# Patient Record
Sex: FEMALE | Race: WHITE | Employment: FULL TIME | URBAN - METROPOLITAN AREA
[De-identification: names, ages, dates, MRNs, and addresses within clinical notes are randomized per-mention and may not be internally consistent; named-entity substitution may affect disease eponyms.]

---

## 2021-07-19 ENCOUNTER — OFFICE VISIT (OUTPATIENT)
Dept: FAMILY MEDICINE CLINIC | Age: 46
End: 2021-07-19
Payer: COMMERCIAL

## 2021-07-19 VITALS
TEMPERATURE: 98.5 F | HEART RATE: 63 BPM | OXYGEN SATURATION: 97 % | BODY MASS INDEX: 22.02 KG/M2 | WEIGHT: 132.2 LBS | HEIGHT: 65 IN | RESPIRATION RATE: 16 BRPM | DIASTOLIC BLOOD PRESSURE: 70 MMHG | SYSTOLIC BLOOD PRESSURE: 126 MMHG

## 2021-07-19 DIAGNOSIS — Z11.59 ENCOUNTER FOR HEPATITIS C SCREENING TEST FOR LOW RISK PATIENT: ICD-10-CM

## 2021-07-19 DIAGNOSIS — Z76.89 ENCOUNTER TO ESTABLISH CARE: Primary | ICD-10-CM

## 2021-07-19 DIAGNOSIS — Z13.220 SCREENING, LIPID: ICD-10-CM

## 2021-07-19 DIAGNOSIS — R00.2 PALPITATIONS: ICD-10-CM

## 2021-07-19 DIAGNOSIS — Z88.6 SAMTER'S TRIAD: ICD-10-CM

## 2021-07-19 DIAGNOSIS — Z13.89 SCREENING FOR BLOOD OR PROTEIN IN URINE: ICD-10-CM

## 2021-07-19 DIAGNOSIS — H61.21 EXCESSIVE CERUMEN IN EAR CANAL, RIGHT: ICD-10-CM

## 2021-07-19 DIAGNOSIS — Z13.1 SCREENING FOR DIABETES MELLITUS: ICD-10-CM

## 2021-07-19 DIAGNOSIS — J45.909 SAMTER'S TRIAD: ICD-10-CM

## 2021-07-19 DIAGNOSIS — J33.9 SAMTER'S TRIAD: ICD-10-CM

## 2021-07-19 DIAGNOSIS — Z13.29 SCREENING FOR THYROID DISORDER: ICD-10-CM

## 2021-07-19 DIAGNOSIS — F32.A ANXIETY AND DEPRESSION: ICD-10-CM

## 2021-07-19 DIAGNOSIS — Z13.0 SCREENING FOR DEFICIENCY ANEMIA: ICD-10-CM

## 2021-07-19 DIAGNOSIS — Z13.21 ENCOUNTER FOR VITAMIN DEFICIENCY SCREENING: ICD-10-CM

## 2021-07-19 DIAGNOSIS — J30.89 ENVIRONMENTAL AND SEASONAL ALLERGIES: ICD-10-CM

## 2021-07-19 DIAGNOSIS — F41.9 ANXIETY AND DEPRESSION: ICD-10-CM

## 2021-07-19 PROCEDURE — 99204 OFFICE O/P NEW MOD 45 MIN: CPT | Performed by: FAMILY MEDICINE

## 2021-07-19 RX ORDER — SERTRALINE HYDROCHLORIDE 100 MG/1
100 TABLET, FILM COATED ORAL DAILY
COMMUNITY

## 2021-07-19 RX ORDER — DROSPIRENONE AND ETHINYL ESTRADIOL 0.02-3(28)
1 KIT ORAL DAILY
COMMUNITY

## 2021-07-19 NOTE — PROGRESS NOTES
Chief Complaint   Patient presents with    Establish Care    Irregular Heart Beat     feels like a flutter over the last couple of months it last about 5 sec. 1. Have you been to the ER, urgent care clinic since your last visit? Hospitalized since your last visit? Yes Where: [facility] back in May    2. Have you seen or consulted any other health care providers outside of the [address] [place] [address] since your last visit? Include any pap smears or colon screening.  Yes Where: psych NP Raf Macdonald seen in May

## 2021-07-19 NOTE — PROGRESS NOTES
Niyah Marion  42 y.o. female  1975  [id number] [facility]  [id number]     [facility]       Encounter Date: 7/19/2021           New Patient Visit Note: Liberty Min MD    Previous PCP: urgent care    Reason for Appointment:  Chief Complaint   Patient presents with   [place] Establish Care    Irregular Heart Beat     feels like a flutter over the last couple of months it last about 5 sec. History of Present Illness:  History provided by patient    Jose Martin Kessler is a 42 y.o. female who presents to clinic today for appointment to establish care. Conditions addressed today include:     Palpitations  Reports over the last two months  Denies any other symtpoms  Timing: last 5-10 seconds  Occurs a few times per week (3-4 times per week)  She reports being at rest when it occurs  No particular trigger  She reports that she has never sen a cardiologist in the past  She denies any current palpitations    Wax in Ear: she reports that it comes and goes. The last time she required irrigation was about 10 years ago. Sampter's Triad: diagnosed about 10 years ago  Seasaonal Allergies: takes claritine PRN  Anxiety and Depression: takes Zoloft, followed by [facility], reports doing well with the zoloft     GYN: dr Mathieu Najera. Abnormal Mammogram: she reports that she had 2nd mammogram and US, She states that it was reported as normal. She will be due for repeat in 1 year. Review of Systems  All other ROS were reviewed and are negative except as discussed in HPI        Allergies: Aspirin and Nsaids (non-steroidal anti-inflammatory drug)    Medications: (Updated to reflect final medication list after visit)    Current Outpatient Medications:     drospirenone-ethinyl estradioL (Radha, 28,) 3-0.02 mg tab, Take 1 Tablet by mouth daily. , Disp: , Rfl:     sertraline (Zoloft) 100 mg tablet, Take 100 mg by mouth daily. , Disp: , Rfl:     carbamide peroxide (DEBROX) 6.5 % otic solution, Administer 5 Drops into each ear two (2) times a day. For two weeks, Disp: 15 mL, Rfl: 0    History  Patient Care Team:  Jane Land MD as PCP - General (Family Medicine)  Jane Land MD as PCP - [facility]    Past Medical History: she has no past medical history on file. Past Surgical History: she has a past surgical history that includes hx  section (10/2017); hx dilation and curettage; hx wisdom teeth extraction; and hx refractive surgery. Family Medical History: family history includes Anxiety in her mother; Heart Disease in her mother; No Known Problems in her father. Social History: she reports that she has never smoked. She has never used smokeless tobacco. She reports current alcohol use. She reports that she does not use drugs. ; Hobbies: exercise, baking, exploring [employer] Due   Topic Date Due    PAP AKA CERVICAL CYTOLOGY  Never done       Objective:   Visit Vitals  /70 (BP 1 Location: Left upper arm, BP Patient Position: Sitting, BP Cuff Size: Adult)   Pulse 63   Temp 98.5 °F (36.9 °C) (Oral)   Resp 16   Ht 5' 5\" (1.651 m)   Wt 132 lb 3.2 oz (60 kg)   LMP 2021   SpO2 97%   BMI 22.00 kg/m²     Wt Readings from Last 3 Encounters:   21 132 lb 3.2 oz (60 kg)       Physical Exam  Vitals reviewed. Constitutional:       General: She is not in acute distress. Appearance: Normal appearance. She is normal weight. She is not ill-appearing or toxic-appearing. HENT:      Head: Normocephalic and atraumatic. Right Ear: Hearing normal. No drainage. No middle ear effusion. Tympanic membrane is not injected or erythematous. Left Ear: Hearing and ear canal normal. No drainage. No middle ear effusion. Tympanic membrane is not injected or erythematous. Ears:      Comments: Excess cerumen in right ear, left ear normal     Nose: Nose normal. No nasal deformity or septal deviation.       Mouth/Throat: Lips: Pink. No lesions. Mouth: Mucous membranes are moist.      Palate: No mass. Pharynx: Oropharynx is clear. Uvula midline. No pharyngeal swelling, oropharyngeal exudate or posterior oropharyngeal erythema. Tonsils: No tonsillar exudate. Eyes:      General: Lids are normal. Vision grossly intact. Gaze aligned appropriately. Right eye: No discharge. Left eye: No discharge. Extraocular Movements: Extraocular movements intact. Conjunctiva/sclera: Conjunctivae normal.      Right eye: Right conjunctiva is not injected. Left eye: Left conjunctiva is not injected. Pupils: Pupils are equal, round, and reactive to light. Neck:      Vascular: No carotid bruit. Cardiovascular:      Rate and Rhythm: Normal rate and regular rhythm. Pulses: Normal pulses. Heart sounds: Normal heart sounds. No murmur heard. No friction rub. No gallop. Pulmonary:      Effort: Pulmonary effort is normal. No respiratory distress. Breath sounds: Normal breath sounds. No stridor. No wheezing, rhonchi or rales. Abdominal:      General: Bowel sounds are normal. There is no distension or abdominal bruit. Tenderness: There is no abdominal tenderness. There is no guarding. Musculoskeletal:         General: Normal range of motion. Cervical back: Normal range of motion and neck supple. No rigidity. No muscular tenderness. Lymphadenopathy:      Cervical: No cervical adenopathy. Skin:     General: Skin is warm. Coloration: Skin is not jaundiced. Neurological:      General: No focal deficit present. Mental Status: She is alert. Motor: Motor function is intact. Coordination: Coordination is intact. Gait: Gait is intact. Deep Tendon Reflexes:      Reflex Scores:       Patellar reflexes are 2+ on the right side and 2+ on the left side.   Psychiatric:         Attention and Perception: Attention and perception normal.         Mood and Affect: Mood and affect normal.         Speech: Speech normal.         Cognition and Memory: Cognition and memory normal.           Assessment & Plan:      ICD-10-CM ICD-9-CM    1. Encounter to establish care  Z76.89 V65.8    2. Palpitations  R00.2 785.1 REFERRAL TO CARDIOLOGY      TSH 3RD GENERATION      T4, FREE      T4, FREE      TSH 3RD GENERATION   3. Excessive cerumen in ear canal, right  H61.21 380.4 carbamide peroxide (DEBROX) 6.5 % otic solution   4. Screening for thyroid disorder  Z13.29 V77.0    5. Screening for blood or protein in urine  Z13.89 V82.9    6. Screening, lipid  Z13.220 V77.91 LIPID PANEL      METABOLIC PANEL, COMPREHENSIVE      METABOLIC PANEL, COMPREHENSIVE      LIPID PANEL   7. Screening for diabetes mellitus  Z13.1 V77.1 HEMOGLOBIN A1C WITH EAG      HEMOGLOBIN A1C WITH EAG   8. Screening for deficiency anemia  Z13.0 V78.1 CBC W/O DIFF      CBC W/O DIFF   9. Encounter for hepatitis C screening test for low risk patient  Z11.59 V73.89 HCV AB W/RFLX TO [facility]      HCV AB W/RFLX TO [facility]   10. Encounter for vitamin deficiency screening  Z13.21 V77.99 VITAMIN D, 25 HYDROXY      VITAMIN D, 25 HYDROXY        Encounter to Establish Care: New patient visit; reviewed and addressed patient's medical history and concerns as discussed in note. Discussed recommendations for diet, exercise, and lifestyle.  Palpations: New problem, uncontrolled. Check labs and referral to cardiology.  Excessive Cerumen: Newly identified, uncontrolled. Treat with debrox. All other conditions listed above: chronic and stable. Will continue current treatment regimen. Will check labs as reflected above. Discussed following up with specialist as scheduled. Discussed recommendations for diet and exercise.  Recommended Screenings: discussed recommendations for screenings as written above          I have discussed the diagnosis with the patient and the intended plan as seen in the above orders.   The patient has received an after-visit summary along with patient information handout. I have discussed medication side effects and warnings with the patient as well.     Disposition  Follow-up and Dispositions    · Return in about 1 year (around 7/19/2022), or sooner if symptoms worsen or fail to improve, for annual physical.           Herbert Keating MD

## 2021-07-20 LAB
25(OH)D3 SERPL-MCNC: 27.2 NG/ML (ref 30–100)
ALBUMIN SERPL-MCNC: 3.6 G/DL (ref 3.5–5)
ALBUMIN/GLOB SERPL: 1.1 {RATIO} (ref 1.1–2.2)
ALP SERPL-CCNC: 55 U/L (ref 45–117)
ALT SERPL-CCNC: 14 U/L (ref 12–78)
ANION GAP SERPL CALC-SCNC: 6 MMOL/L (ref 5–15)
AST SERPL-CCNC: 9 U/L (ref 15–37)
BILIRUB SERPL-MCNC: 0.3 MG/DL (ref 0.2–1)
BUN SERPL-MCNC: 9 MG/DL (ref 6–20)
BUN/CREAT SERPL: 12 (ref 12–20)
CALCIUM SERPL-MCNC: 8.8 MG/DL (ref 8.5–10.1)
CHLORIDE SERPL-SCNC: 107 MMOL/L (ref 97–108)
CHOLEST SERPL-MCNC: 263 MG/DL
CO2 SERPL-SCNC: 27 MMOL/L (ref 21–32)
CREAT SERPL-MCNC: 0.77 MG/DL (ref 0.55–1.02)
ERYTHROCYTE [DISTWIDTH] IN BLOOD BY AUTOMATED COUNT: 13 % (ref 11.5–14.5)
EST. AVERAGE GLUCOSE BLD GHB EST-MCNC: 103 MG/DL
GLOBULIN SER CALC-MCNC: 3.3 G/DL (ref 2–4)
GLUCOSE SERPL-MCNC: 88 MG/DL (ref 65–100)
HBA1C MFR BLD: 5.2 % (ref 4–5.6)
HCT VFR BLD AUTO: 44.7 % (ref 35–47)
HDLC SERPL-MCNC: 82 MG/DL
HDLC SERPL: 3.2 {RATIO} (ref 0–5)
HGB BLD-MCNC: 14.2 G/DL (ref 11.5–16)
LDLC SERPL CALC-MCNC: 157.6 MG/DL (ref 0–100)
MCH RBC QN AUTO: 31.1 PG (ref 26–34)
MCHC RBC AUTO-ENTMCNC: 31.8 G/DL (ref 30–36.5)
MCV RBC AUTO: 97.8 FL (ref 80–99)
NRBC # BLD: 0 K/UL (ref 0–0.01)
NRBC BLD-RTO: 0 PER 100 WBC
PLATELET # BLD AUTO: 263 K/UL (ref 150–400)
PMV BLD AUTO: 9.9 FL (ref 8.9–12.9)
POTASSIUM SERPL-SCNC: 4 MMOL/L (ref 3.5–5.1)
PROT SERPL-MCNC: 6.9 G/DL (ref 6.4–8.2)
RBC # BLD AUTO: 4.57 M/UL (ref 3.8–5.2)
SODIUM SERPL-SCNC: 140 MMOL/L (ref 136–145)
T4 FREE SERPL-MCNC: 1.1 NG/DL (ref 0.8–1.5)
TRIGL SERPL-MCNC: 117 MG/DL (ref ?–150)
TSH SERPL DL<=0.05 MIU/L-ACNC: 1.43 UIU/ML (ref 0.36–3.74)
VLDLC SERPL CALC-MCNC: 23.4 MG/DL
WBC # BLD AUTO: 7.8 K/UL (ref 3.6–11)

## 2021-07-21 LAB
HCV AB S/CO SERPL IA: 0.2 S/CO RATIO (ref 0–0.9)
HCV AB SERPL QL IA: NORMAL

## 2021-07-25 PROBLEM — Z88.6 SAMTER'S TRIAD: Status: ACTIVE | Noted: 2021-07-25

## 2021-07-25 PROBLEM — R00.2 PALPITATIONS: Status: ACTIVE | Noted: 2021-07-25

## 2021-07-25 PROBLEM — H61.21 EXCESSIVE CERUMEN IN EAR CANAL, RIGHT: Status: ACTIVE | Noted: 2021-07-25

## 2021-07-25 PROBLEM — F32.A ANXIETY AND DEPRESSION: Status: ACTIVE | Noted: 2021-07-25

## 2021-07-25 PROBLEM — J45.909 SAMTER'S TRIAD: Status: ACTIVE | Noted: 2021-07-25

## 2021-07-25 PROBLEM — F41.9 ANXIETY AND DEPRESSION: Status: ACTIVE | Noted: 2021-07-25

## 2021-07-25 PROBLEM — J30.89 ENVIRONMENTAL AND SEASONAL ALLERGIES: Status: ACTIVE | Noted: 2021-07-25

## 2021-07-25 PROBLEM — J33.9 SAMTER'S TRIAD: Status: ACTIVE | Noted: 2021-07-25

## 2021-10-07 ENCOUNTER — OFFICE VISIT (OUTPATIENT)
Dept: CARDIOLOGY CLINIC | Age: 46
End: 2021-10-07
Payer: COMMERCIAL

## 2021-10-07 VITALS
OXYGEN SATURATION: 100 % | DIASTOLIC BLOOD PRESSURE: 60 MMHG | WEIGHT: 132 LBS | SYSTOLIC BLOOD PRESSURE: 100 MMHG | RESPIRATION RATE: 18 BRPM | HEART RATE: 71 BPM | BODY MASS INDEX: 21.99 KG/M2 | HEIGHT: 65 IN

## 2021-10-07 DIAGNOSIS — R00.2 PALPITATIONS: Primary | ICD-10-CM

## 2021-10-07 PROCEDURE — 99244 OFF/OP CNSLTJ NEW/EST MOD 40: CPT | Performed by: SPECIALIST

## 2021-10-07 PROCEDURE — 93000 ELECTROCARDIOGRAM COMPLETE: CPT | Performed by: SPECIALIST

## 2021-10-07 NOTE — PROGRESS NOTES
HISTORY OF PRESENT ILLNESS  [place] Valenzuela is a 42 y.o. female. She is referred for evaluation of palpitations. She has been having palpitations several times a week lasting about 5 seconds at a time. She exercises 3-4 times a week doing cardio and high intensity training. She has no family history of heart disease but does have an elevated cholesterol 263 with an LDL cholesterol of 157. However her HDL cholesterol is quite high at 82. She drinks 1 cup of coffee per day and does not smoke cigarettes. She drinks alcohol socially. Her blood pressure has always been low. Her symptoms seem to be improving somewhat and to occur only at rest.  HPI  Patient Active Problem List   Diagnosis Code    Anxiety and depression F41.9, F32. A    Environmental and seasonal allergies J30.89    Samter's triad J45.909, J33.9, Z88.6    Excessive cerumen in ear canal, right H61.21    Palpitations R00.2     Current Outpatient Medications   Medication Sig Dispense Refill    drospirenone-ethinyl estradioL (Radha, 28,) 3-0.02 mg tab Take 1 Tablet by mouth daily.  sertraline (Zoloft) 100 mg tablet Take 100 mg by mouth daily.  carbamide peroxide (DEBROX) 6.5 % otic solution Administer 5 Drops into each ear two (2) times a day. For two weeks (Patient not taking: Reported on 10/7/2021) 15 mL 0     No past medical history on file. Past Surgical History:   Procedure Laterality Date    HX  SECTION  10/2017    HX DILATION AND CURETTAGE      HX REFRACTIVE SURGERY      HX WISDOM TEETH EXTRACTION         Review of Systems   Cardiovascular: Positive for palpitations. All other systems reviewed and are negative. Visit Vitals  /60 (BP 1 Location: Right arm, BP Patient Position: Sitting, BP Cuff Size: Adult)   Pulse 71   Resp 18   Ht 5' 5\" (1.651 m)   Wt 132 lb (59.9 kg)   SpO2 100%   BMI 21.97 kg/m²       Physical Exam  Vitals and nursing note reviewed. Constitutional:       Appearance: Normal appearance.  She is normal weight. HENT:      Head: Normocephalic. Right Ear: Tympanic membrane normal.      Left Ear: Tympanic membrane normal.      Nose: Nose normal.      Mouth/Throat:      Mouth: Mucous membranes are moist.   Eyes:      Extraocular Movements: Extraocular movements intact. Cardiovascular:      Rate and Rhythm: Normal rate and regular rhythm. Pulses: Normal pulses. Heart sounds: Normal heart sounds. Pulmonary:      Breath sounds: Normal breath sounds. Abdominal:      Palpations: Abdomen is soft. Musculoskeletal:         General: Normal range of motion. Cervical back: Normal range of motion. Skin:     General: Skin is warm. Neurological:      Mental Status: She is alert and oriented to person, place, and time. Psychiatric:         Behavior: Behavior normal.         ASSESSMENT and PLAN  She is very fit and could not tolerate any therapy for any premature beats due to her low blood pressure. Therefore I will not get a monitor. I do not think stress testing is indicated. Her EKG and exam are unremarkable. I do not even think an echocardiogram is necessary at this point. Since her symptoms seem to be slightly improved I will have her try some over-the-counter fish oil to see if they improve. If not or if they become worse she will call and we will consider noninvasive testing. Otherwise I will see her back as necessary.

## 2022-03-18 PROBLEM — H61.21 EXCESSIVE CERUMEN IN EAR CANAL, RIGHT: Status: ACTIVE | Noted: 2021-07-25

## 2022-03-18 PROBLEM — R00.2 PALPITATIONS: Status: ACTIVE | Noted: 2021-07-25

## 2022-03-19 PROBLEM — F41.9 ANXIETY AND DEPRESSION: Status: ACTIVE | Noted: 2021-07-25

## 2022-03-19 PROBLEM — Z88.6 SAMTER'S TRIAD: Status: ACTIVE | Noted: 2021-07-25

## 2022-03-19 PROBLEM — F32.A ANXIETY AND DEPRESSION: Status: ACTIVE | Noted: 2021-07-25

## 2022-03-19 PROBLEM — J45.909 SAMTER'S TRIAD: Status: ACTIVE | Noted: 2021-07-25

## 2022-03-19 PROBLEM — J33.9 SAMTER'S TRIAD: Status: ACTIVE | Noted: 2021-07-25

## 2022-03-19 PROBLEM — J30.89 ENVIRONMENTAL AND SEASONAL ALLERGIES: Status: ACTIVE | Noted: 2021-07-25

## 2023-05-12 RX ORDER — SERTRALINE HYDROCHLORIDE 100 MG/1
100 TABLET, FILM COATED ORAL DAILY
COMMUNITY

## 2023-05-12 RX ORDER — DROSPIRENONE AND ETHINYL ESTRADIOL 0.02-3(28)
1 KIT ORAL DAILY
COMMUNITY

## 2023-07-26 ENCOUNTER — OFFICE VISIT (OUTPATIENT)
Age: 48
End: 2023-07-26
Payer: COMMERCIAL

## 2023-07-26 VITALS
DIASTOLIC BLOOD PRESSURE: 65 MMHG | BODY MASS INDEX: 22.14 KG/M2 | WEIGHT: 132.9 LBS | SYSTOLIC BLOOD PRESSURE: 98 MMHG | OXYGEN SATURATION: 98 % | HEIGHT: 65 IN | HEART RATE: 82 BPM | TEMPERATURE: 98.3 F | RESPIRATION RATE: 16 BRPM

## 2023-07-26 DIAGNOSIS — N94.6 DYSMENORRHEA: ICD-10-CM

## 2023-07-26 DIAGNOSIS — Z00.00 VISIT FOR ANNUAL HEALTH EXAMINATION: Primary | ICD-10-CM

## 2023-07-26 DIAGNOSIS — E78.5 HYPERLIPIDEMIA, UNSPECIFIED HYPERLIPIDEMIA TYPE: ICD-10-CM

## 2023-07-26 DIAGNOSIS — E55.9 VITAMIN D INSUFFICIENCY: ICD-10-CM

## 2023-07-26 DIAGNOSIS — Z11.3 ROUTINE SCREENING FOR STI (SEXUALLY TRANSMITTED INFECTION): ICD-10-CM

## 2023-07-26 LAB
25(OH)D3 SERPL-MCNC: 32.8 NG/ML (ref 30–100)
ALBUMIN SERPL-MCNC: 3.4 G/DL (ref 3.5–5)
ALBUMIN/GLOB SERPL: 1 (ref 1.1–2.2)
ALP SERPL-CCNC: 44 U/L (ref 45–117)
ALT SERPL-CCNC: 16 U/L (ref 12–78)
ANION GAP SERPL CALC-SCNC: 5 MMOL/L (ref 5–15)
AST SERPL-CCNC: 10 U/L (ref 15–37)
BILIRUB SERPL-MCNC: 0.3 MG/DL (ref 0.2–1)
BUN SERPL-MCNC: 12 MG/DL (ref 6–20)
BUN/CREAT SERPL: 15 (ref 12–20)
CALCIUM SERPL-MCNC: 9 MG/DL (ref 8.5–10.1)
CHLORIDE SERPL-SCNC: 106 MMOL/L (ref 97–108)
CHOLEST SERPL-MCNC: 217 MG/DL
CO2 SERPL-SCNC: 28 MMOL/L (ref 21–32)
CREAT SERPL-MCNC: 0.8 MG/DL (ref 0.55–1.02)
GLOBULIN SER CALC-MCNC: 3.3 G/DL (ref 2–4)
GLUCOSE SERPL-MCNC: 79 MG/DL (ref 65–100)
HDLC SERPL-MCNC: 63 MG/DL
HDLC SERPL: 3.4 (ref 0–5)
LDLC SERPL CALC-MCNC: 123.6 MG/DL (ref 0–100)
POTASSIUM SERPL-SCNC: 4.1 MMOL/L (ref 3.5–5.1)
PROT SERPL-MCNC: 6.7 G/DL (ref 6.4–8.2)
SODIUM SERPL-SCNC: 139 MMOL/L (ref 136–145)
TRIGL SERPL-MCNC: 152 MG/DL
TSH SERPL DL<=0.05 MIU/L-ACNC: 1.7 UIU/ML (ref 0.36–3.74)
VLDLC SERPL CALC-MCNC: 30.4 MG/DL

## 2023-07-26 PROCEDURE — 99396 PREV VISIT EST AGE 40-64: CPT | Performed by: STUDENT IN AN ORGANIZED HEALTH CARE EDUCATION/TRAINING PROGRAM

## 2023-07-26 PROCEDURE — 99214 OFFICE O/P EST MOD 30 MIN: CPT | Performed by: STUDENT IN AN ORGANIZED HEALTH CARE EDUCATION/TRAINING PROGRAM

## 2023-07-26 SDOH — ECONOMIC STABILITY: INCOME INSECURITY: HOW HARD IS IT FOR YOU TO PAY FOR THE VERY BASICS LIKE FOOD, HOUSING, MEDICAL CARE, AND HEATING?: NOT HARD AT ALL

## 2023-07-26 SDOH — ECONOMIC STABILITY: FOOD INSECURITY: WITHIN THE PAST 12 MONTHS, YOU WORRIED THAT YOUR FOOD WOULD RUN OUT BEFORE YOU GOT MONEY TO BUY MORE.: NEVER TRUE

## 2023-07-26 SDOH — ECONOMIC STABILITY: HOUSING INSECURITY
IN THE LAST 12 MONTHS, WAS THERE A TIME WHEN YOU DID NOT HAVE A STEADY PLACE TO SLEEP OR SLEPT IN A SHELTER (INCLUDING NOW)?: NO

## 2023-07-26 SDOH — ECONOMIC STABILITY: FOOD INSECURITY: WITHIN THE PAST 12 MONTHS, THE FOOD YOU BOUGHT JUST DIDN'T LAST AND YOU DIDN'T HAVE MONEY TO GET MORE.: NEVER TRUE

## 2023-07-26 ASSESSMENT — PATIENT HEALTH QUESTIONNAIRE - PHQ9
1. LITTLE INTEREST OR PLEASURE IN DOING THINGS: 0
10. IF YOU CHECKED OFF ANY PROBLEMS, HOW DIFFICULT HAVE THESE PROBLEMS MADE IT FOR YOU TO DO YOUR WORK, TAKE CARE OF THINGS AT HOME, OR GET ALONG WITH OTHER PEOPLE: 0
7. TROUBLE CONCENTRATING ON THINGS, SUCH AS READING THE NEWSPAPER OR WATCHING TELEVISION: 0
5. POOR APPETITE OR OVEREATING: 0
SUM OF ALL RESPONSES TO PHQ QUESTIONS 1-9: 1
SUM OF ALL RESPONSES TO PHQ QUESTIONS 1-9: 1
SUM OF ALL RESPONSES TO PHQ9 QUESTIONS 1 & 2: 0
4. FEELING TIRED OR HAVING LITTLE ENERGY: 0
2. FEELING DOWN, DEPRESSED OR HOPELESS: 0
SUM OF ALL RESPONSES TO PHQ QUESTIONS 1-9: 1
9. THOUGHTS THAT YOU WOULD BE BETTER OFF DEAD, OR OF HURTING YOURSELF: 0
SUM OF ALL RESPONSES TO PHQ QUESTIONS 1-9: 1
3. TROUBLE FALLING OR STAYING ASLEEP: 1
8. MOVING OR SPEAKING SO SLOWLY THAT OTHER PEOPLE COULD HAVE NOTICED. OR THE OPPOSITE, BEING SO FIGETY OR RESTLESS THAT YOU HAVE BEEN MOVING AROUND A LOT MORE THAN USUAL: 0
6. FEELING BAD ABOUT YOURSELF - OR THAT YOU ARE A FAILURE OR HAVE LET YOURSELF OR YOUR FAMILY DOWN: 0

## 2023-07-26 NOTE — PROGRESS NOTES
[facility]      Chief Complaint:     Chief Complaint   Patient presents with    Annual Exam     Patient concerns for fibroids due to menstrual being heavier than normal and feeling pressure in her uterus     Established New Doctor       Daniel Alvarado is a 57 y.o. female that presents for: establish care      Assessment/Plan:     Cristiane was seen today for annual exam and established new doctor. Diagnoses and all orders for this visit:    Visit for annual health examination    Hyperlipidemia, unspecified hyperlipidemia type  -     Lipid Panel; Future  -     Comprehensive Metabolic Panel; Future  -     Comprehensive Metabolic Panel  -     Lipid Panel    Vitamin D insufficiency  -     Vitamin D 25 Hydroxy; Future  -     Vitamin D 25 Hydroxy    Routine screening for STI (sexually transmitted infection)  -     Hepatitis Be antigen; Future  -     RPR; Future  -     Hepatitis C Antibody; Future  -     HIV 1/2 Ag/Ab, 4TH Generation,W Rflx Confirm; Future  -     Chlamydia, Gonorrhea, Trichomoniasis; Future  -     Chlamydia, Gonorrhea, Trichomoniasis  -     HIV 1/2 Ag/Ab, 4TH Generation,W Rflx Confirm  -     Hepatitis C Antibody  -     RPR  -     Hepatitis Be antigen    Dysmenorrhea  -     TSH; Future  -     US NON OB TRANSVAGINAL; Future  -     TSH           Follow up:     Return in about 1 year (around 7/26/2024) for annual physical, labs 1 week before. Subjective:   HPI:  Daniel Alvarado is a 57 y.o. female that presents for: establish care    Previous PCP: Irma Ruiz    CC:  Abdominal fullness  Period in the past 6 months is heavier, more crampy, on OCP --periods are still regular.   Denies any fevers, chills, unexpected weight changes, change in appetite, hot flashes  Mother had fibroids     MedHx  Bryant's triad  Anxiety and Depression  Palpitations: Saw Dr. Shakira Whitehead 10/2021; none since   HLD: Last  in 2021  Vit D Def    HM:  Pap at [facility] 2/23    Social Hx:  Diet: doesn't eat red meat or pork, lean

## 2023-07-27 LAB
HCV AB SERPL QL IA: NONREACTIVE
HIV 1+2 AB+HIV1 P24 AG SERPL QL IA: NONREACTIVE
HIV 1/2 RESULT COMMENT: NORMAL
RPR SER QL: NONREACTIVE

## 2023-07-28 LAB — HBV E AG SERPL QL IA: NEGATIVE

## 2023-07-30 LAB
C TRACH RRNA SPEC QL NAA+PROBE: NEGATIVE
N GONORRHOEA RRNA SPEC QL NAA+PROBE: NEGATIVE
SPECIMEN SOURCE: NORMAL
T VAGINALIS RRNA SPEC QL NAA+PROBE: NEGATIVE

## 2023-08-22 ENCOUNTER — HOSPITAL ENCOUNTER (OUTPATIENT)
Facility: HOSPITAL | Age: 48
Discharge: HOME | End: 2023-08-25
Attending: STUDENT IN AN ORGANIZED HEALTH CARE EDUCATION/TRAINING PROGRAM
Payer: COMMERCIAL

## 2023-08-22 DIAGNOSIS — N94.6 DYSMENORRHEA: ICD-10-CM

## 2023-08-22 PROCEDURE — 76830 TRANSVAGINAL US NON-OB: CPT

## 2023-08-22 PROCEDURE — 76856 US EXAM PELVIC COMPLETE: CPT
